# Patient Record
Sex: MALE | Race: WHITE
[De-identification: names, ages, dates, MRNs, and addresses within clinical notes are randomized per-mention and may not be internally consistent; named-entity substitution may affect disease eponyms.]

---
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
## 2022-11-21 ENCOUNTER — OFFICE (OUTPATIENT)
Dept: URBAN - METROPOLITAN AREA CLINIC 115 | Facility: CLINIC | Age: 22
Setting detail: OPHTHALMOLOGY
End: 2022-11-21
Payer: COMMERCIAL

## 2022-11-21 DIAGNOSIS — H10.45: ICD-10-CM

## 2022-11-21 DIAGNOSIS — D31.40: ICD-10-CM

## 2022-11-21 DIAGNOSIS — H43.391: ICD-10-CM

## 2022-11-21 DIAGNOSIS — H50.52: ICD-10-CM

## 2022-11-21 DIAGNOSIS — G43.109: ICD-10-CM

## 2022-11-21 DIAGNOSIS — H52.13: ICD-10-CM

## 2022-11-21 DIAGNOSIS — A69.20: ICD-10-CM

## 2022-11-21 PROCEDURE — 92014 COMPRE OPH EXAM EST PT 1/>: CPT | Performed by: OPHTHALMOLOGY

## 2022-11-21 ASSESSMENT — REFRACTION_MANIFEST
OS_CYLINDER: -1.25
OS_VA1: 20/20
OD_SPHERE: -8.50
OS_AXIS: 015
OD_AXIS: 150
OS_SPHERE: -8.75
OD_VA1: 20/20
OD_CYLINDER: -1.25

## 2022-11-21 ASSESSMENT — REFRACTION_CURRENTRX
OD_CYLINDER: -1.00
OS_SPHERE: -7.50
OD_OVR_VA: 20/
OD_VPRISM_DIRECTION: SV
OS_VPRISM_DIRECTION: SV
OS_VPRISM_DIRECTION: SV
OS_AXIS: 10
OD_AXIS: 146
OD_VPRISM_DIRECTION: SV
OS_CYLINDER: -1.00
OS_AXIS: 008
OS_OVR_VA: 20/
OD_AXIS: 158
OD_CYLINDER: -0.75
OS_CYLINDER: -0.50
OD_SPHERE: -8.25
OS_SPHERE: -8.25
OS_OVR_VA: 20/
OD_OVR_VA: 20/
OD_SPHERE: -7.00

## 2022-11-21 ASSESSMENT — REFRACTION_AUTOREFRACTION
OD_SPHERE: -9.75
OS_SPHERE: -10.25
OS_CYLINDER: -1.75
OS_AXIS: 179
OD_CYLINDER: -2.00
OD_AXIS: 161

## 2022-11-21 ASSESSMENT — SPHEQUIV_DERIVED
OD_SPHEQUIV: -9.125
OS_SPHEQUIV: -11.125
OS_SPHEQUIV: -9.375
OD_SPHEQUIV: -10.75

## 2022-11-21 ASSESSMENT — VISUAL ACUITY
OD_BCVA: 20/20-
OS_BCVA: 20/30-

## 2022-11-21 ASSESSMENT — CONFRONTATIONAL VISUAL FIELD TEST (CVF)
OD_FINDINGS: FULL
OS_FINDINGS: FULL

## 2022-11-21 ASSESSMENT — TONOMETRY: OD_IOP_MMHG: 19

## 2023-01-05 ENCOUNTER — OFFICE (OUTPATIENT)
Dept: URBAN - METROPOLITAN AREA CLINIC 115 | Facility: CLINIC | Age: 23
Setting detail: OPHTHALMOLOGY
End: 2023-01-05
Payer: COMMERCIAL

## 2023-01-05 DIAGNOSIS — A69.20: ICD-10-CM

## 2023-01-05 DIAGNOSIS — G43.109: ICD-10-CM

## 2023-01-05 DIAGNOSIS — H43.393: ICD-10-CM

## 2023-01-05 PROCEDURE — 92201 OPSCPY EXTND RTA DRAW UNI/BI: CPT | Performed by: OPHTHALMOLOGY

## 2023-01-05 PROCEDURE — 92014 COMPRE OPH EXAM EST PT 1/>: CPT | Performed by: OPHTHALMOLOGY

## 2023-01-05 PROCEDURE — 92134 CPTRZ OPH DX IMG PST SGM RTA: CPT | Performed by: OPHTHALMOLOGY

## 2023-01-05 ASSESSMENT — VISUAL ACUITY
OS_BCVA: 20/20-1
OD_BCVA: 20/20-1

## 2023-01-05 ASSESSMENT — REFRACTION_AUTOREFRACTION
OD_AXIS: 161
OS_AXIS: 179
OD_CYLINDER: -2.00
OS_SPHERE: -10.25
OS_CYLINDER: -1.75
OD_SPHERE: -9.75

## 2023-01-05 ASSESSMENT — TONOMETRY
OS_IOP_MMHG: 15
OD_IOP_MMHG: 16

## 2023-01-05 ASSESSMENT — SPHEQUIV_DERIVED
OS_SPHEQUIV: -11.125
OD_SPHEQUIV: -10.75

## 2023-02-08 ENCOUNTER — OFFICE (OUTPATIENT)
Dept: URBAN - METROPOLITAN AREA CLINIC 115 | Facility: CLINIC | Age: 23
Setting detail: OPHTHALMOLOGY
End: 2023-02-08
Payer: COMMERCIAL

## 2023-02-08 DIAGNOSIS — G43.109: ICD-10-CM

## 2023-02-08 DIAGNOSIS — H43.393: ICD-10-CM

## 2023-02-08 DIAGNOSIS — H10.45: ICD-10-CM

## 2023-02-08 DIAGNOSIS — A69.20: ICD-10-CM

## 2023-02-08 DIAGNOSIS — H53.433: ICD-10-CM

## 2023-02-08 PROCEDURE — 92083 EXTENDED VISUAL FIELD XM: CPT | Performed by: OPHTHALMOLOGY

## 2023-02-08 PROCEDURE — 92133 CPTRZD OPH DX IMG PST SGM ON: CPT | Performed by: OPHTHALMOLOGY

## 2023-02-08 PROCEDURE — 92014 COMPRE OPH EXAM EST PT 1/>: CPT | Performed by: OPHTHALMOLOGY

## 2023-02-08 ASSESSMENT — REFRACTION_CURRENTRX
OS_VPRISM_DIRECTION: SV
OD_AXIS: 150
OS_CYLINDER: -1.25
OS_OVR_VA: 20/
OS_AXIS: 008
OD_CYLINDER: -1.25
OS_SPHERE: -8.75
OD_OVR_VA: 20/
OD_SPHERE: -8.50
OD_VPRISM_DIRECTION: SV

## 2023-02-08 ASSESSMENT — REFRACTION_AUTOREFRACTION
OS_CYLINDER: -1.25
OD_AXIS: 159
OS_AXIS: 006
OS_SPHERE: -9.50
OD_SPHERE: -9.25
OD_CYLINDER: -1.50

## 2023-02-08 ASSESSMENT — VISUAL ACUITY
OD_BCVA: 20/25+3
OS_BCVA: 20/20-1

## 2023-02-08 ASSESSMENT — CONFRONTATIONAL VISUAL FIELD TEST (CVF)
OD_FINDINGS: FULL
OS_FINDINGS: FULL

## 2023-02-08 ASSESSMENT — TONOMETRY
OS_IOP_MMHG: 18
OD_IOP_MMHG: 18

## 2023-02-08 ASSESSMENT — SPHEQUIV_DERIVED
OS_SPHEQUIV: -10.125
OD_SPHEQUIV: -10

## 2023-03-14 ENCOUNTER — OFFICE (OUTPATIENT)
Dept: URBAN - METROPOLITAN AREA CLINIC 111 | Facility: CLINIC | Age: 23
Setting detail: OPHTHALMOLOGY
End: 2023-03-14
Payer: COMMERCIAL

## 2023-03-14 VITALS — HEIGHT: 73 IN | WEIGHT: 285.6 LBS | BODY MASS INDEX: 37.85 KG/M2

## 2023-03-14 DIAGNOSIS — H10.45: ICD-10-CM

## 2023-03-14 DIAGNOSIS — H50.52: ICD-10-CM

## 2023-03-14 DIAGNOSIS — A69.20: ICD-10-CM

## 2023-03-14 DIAGNOSIS — D31.40: ICD-10-CM

## 2023-03-14 DIAGNOSIS — G43.109: ICD-10-CM

## 2023-03-14 DIAGNOSIS — H52.13: ICD-10-CM

## 2023-03-14 DIAGNOSIS — H43.393: ICD-10-CM

## 2023-03-14 PROCEDURE — 92015 DETERMINE REFRACTIVE STATE: CPT | Performed by: OPHTHALMOLOGY

## 2023-03-14 PROCEDURE — 92014 COMPRE OPH EXAM EST PT 1/>: CPT | Performed by: OPHTHALMOLOGY

## 2023-03-14 ASSESSMENT — VISUAL ACUITY
OD_BCVA: 20/20
OS_BCVA: 20/25-1,20-1

## 2023-03-14 ASSESSMENT — REFRACTION_MANIFEST
OS_SPHERE: -9.00
OD_AXIS: 150
OD_VA1: 20/20
OS_CYLINDER: -1.25
OS_VA1: 20/20
OD_CYLINDER: -1.50
OS_AXIS: 010
OD_SPHERE: -8.50

## 2023-03-14 ASSESSMENT — REFRACTION_CURRENTRX
OS_CYLINDER: -1.25
OS_VPRISM_DIRECTION: SV
OS_SPHERE: -8.75
OD_VPRISM_DIRECTION: SV
OD_CYLINDER: -1.25
OD_OVR_VA: 20/
OD_SPHERE: -8.50
OD_AXIS: 150
OS_AXIS: 008
OS_OVR_VA: 20/

## 2023-03-14 ASSESSMENT — REFRACTION_AUTOREFRACTION
OD_AXIS: 159
OD_CYLINDER: -1.50
OS_AXIS: 010
OS_CYLINDER: -1.25
OS_SPHERE: -9.75
OD_SPHERE: -9.25

## 2023-03-14 ASSESSMENT — TONOMETRY
OD_IOP_MMHG: 17
OS_IOP_MMHG: 17

## 2023-03-14 ASSESSMENT — SPHEQUIV_DERIVED
OS_SPHEQUIV: -10.375
OD_SPHEQUIV: -9.25
OS_SPHEQUIV: -9.625
OD_SPHEQUIV: -10

## 2023-03-14 ASSESSMENT — CONFRONTATIONAL VISUAL FIELD TEST (CVF)
OD_FINDINGS: FULL
OS_FINDINGS: FULL

## 2023-03-18 ENCOUNTER — NON-APPOINTMENT (OUTPATIENT)
Age: 23
End: 2023-03-18

## 2023-03-29 ENCOUNTER — OFFICE (OUTPATIENT)
Dept: URBAN - METROPOLITAN AREA CLINIC 115 | Facility: CLINIC | Age: 23
Setting detail: OPHTHALMOLOGY
End: 2023-03-29
Payer: COMMERCIAL

## 2023-03-29 DIAGNOSIS — A69.20: ICD-10-CM

## 2023-03-29 DIAGNOSIS — D31.40: ICD-10-CM

## 2023-03-29 DIAGNOSIS — H50.52: ICD-10-CM

## 2023-03-29 DIAGNOSIS — G43.109: ICD-10-CM

## 2023-03-29 DIAGNOSIS — H43.393: ICD-10-CM

## 2023-03-29 DIAGNOSIS — H52.13: ICD-10-CM

## 2023-03-29 DIAGNOSIS — H10.45: ICD-10-CM

## 2023-03-29 PROCEDURE — 99213 OFFICE O/P EST LOW 20 MIN: CPT | Performed by: OPHTHALMOLOGY

## 2023-03-29 PROCEDURE — 92250 FUNDUS PHOTOGRAPHY W/I&R: CPT | Performed by: OPHTHALMOLOGY

## 2023-03-29 PROCEDURE — 92083 EXTENDED VISUAL FIELD XM: CPT | Performed by: OPHTHALMOLOGY

## 2023-03-29 ASSESSMENT — REFRACTION_CURRENTRX
OD_SPHERE: -8.50
OS_SPHERE: -8.75
OS_VPRISM_DIRECTION: SV
OS_AXIS: 008
OS_OVR_VA: 20/
OS_CYLINDER: -1.25
OD_CYLINDER: -1.25
OD_VPRISM_DIRECTION: SV
OD_AXIS: 150
OD_OVR_VA: 20/

## 2023-03-29 ASSESSMENT — REFRACTION_MANIFEST
OD_CYLINDER: -1.50
OS_CYLINDER: -1.25
OD_AXIS: 150
OD_VA1: 20/20
OS_VA1: 20/20
OS_AXIS: 010
OD_SPHERE: -8.50
OS_SPHERE: -9.00

## 2023-03-29 ASSESSMENT — REFRACTION_AUTOREFRACTION
OS_CYLINDER: -0.75
OS_SPHERE: -9.25
OS_AXIS: 018
OD_CYLINDER: -1.00
OD_SPHERE: -10.00
OD_AXIS: 160

## 2023-03-29 ASSESSMENT — VISUAL ACUITY
OD_BCVA: 20/20
OS_BCVA: 20/20

## 2023-03-29 ASSESSMENT — CONFRONTATIONAL VISUAL FIELD TEST (CVF)
OS_FINDINGS: FULL
OD_FINDINGS: FULL

## 2023-03-29 ASSESSMENT — SPHEQUIV_DERIVED
OS_SPHEQUIV: -9.625
OD_SPHEQUIV: -9.25
OD_SPHEQUIV: -10.5
OS_SPHEQUIV: -9.625

## 2023-03-29 ASSESSMENT — TONOMETRY
OD_IOP_MMHG: 16
OS_IOP_MMHG: 14

## 2024-04-15 ENCOUNTER — OFFICE (OUTPATIENT)
Dept: URBAN - METROPOLITAN AREA CLINIC 115 | Facility: CLINIC | Age: 24
Setting detail: OPHTHALMOLOGY
End: 2024-04-15
Payer: COMMERCIAL

## 2024-04-15 VITALS — HEIGHT: 73 IN | WEIGHT: 285.6 LBS | BODY MASS INDEX: 37.85 KG/M2

## 2024-04-15 DIAGNOSIS — D31.40: ICD-10-CM

## 2024-04-15 DIAGNOSIS — G43.109: ICD-10-CM

## 2024-04-15 DIAGNOSIS — H52.13: ICD-10-CM

## 2024-04-15 DIAGNOSIS — H10.45: ICD-10-CM

## 2024-04-15 DIAGNOSIS — H50.52: ICD-10-CM

## 2024-04-15 DIAGNOSIS — A69.20: ICD-10-CM

## 2024-04-15 DIAGNOSIS — H43.393: ICD-10-CM

## 2024-04-15 PROCEDURE — 92015 DETERMINE REFRACTIVE STATE: CPT | Performed by: OPHTHALMOLOGY

## 2024-04-15 PROCEDURE — 92014 COMPRE OPH EXAM EST PT 1/>: CPT | Performed by: OPHTHALMOLOGY

## 2025-04-10 ENCOUNTER — OFFICE (OUTPATIENT)
Dept: URBAN - METROPOLITAN AREA CLINIC 115 | Facility: CLINIC | Age: 25
Setting detail: OPHTHALMOLOGY
End: 2025-04-10
Payer: COMMERCIAL

## 2025-04-10 VITALS — BODY MASS INDEX: 37.85 KG/M2 | WEIGHT: 285.6 LBS | HEIGHT: 73 IN

## 2025-04-10 DIAGNOSIS — H52.13: ICD-10-CM

## 2025-04-10 DIAGNOSIS — H43.393: ICD-10-CM

## 2025-04-10 PROCEDURE — 92012 INTRM OPH EXAM EST PATIENT: CPT | Performed by: OPTOMETRIST

## 2025-04-10 PROCEDURE — 92015 DETERMINE REFRACTIVE STATE: CPT | Performed by: OPTOMETRIST

## 2025-04-10 ASSESSMENT — REFRACTION_AUTOREFRACTION
OS_CYLINDER: -1.00
OD_CYLINDER: -1.50
OD_SPHERE: -9.50
OD_AXIS: 160
OS_SPHERE: -10.00
OS_AXIS: 011

## 2025-04-10 ASSESSMENT — REFRACTION_MANIFEST
OD_AXIS: 155
OD_VA1: 20/20
OS_AXIS: 011
OS_VA1: 20/20
OD_CYLINDER: -1.25
OS_SPHERE: -9.50
OD_VA1: 20/20
OS_VA1: 20/20
OS_CYLINDER: -0.50
OD_SPHERE: -8.75
OU_VA: 20/20
OS_SPHERE: -9.50
OD_CYLINDER: -1.50
OS_CYLINDER: -1.00
OS_AXIS: 005
OD_SPHERE: -9.00
OD_AXIS: 160

## 2025-04-10 ASSESSMENT — REFRACTION_CURRENTRX
OS_OVR_VA: 20/
OD_AXIS: 150
OS_SPHERE: -8.75
OD_CYLINDER: -1.25
OS_CYLINDER: -0.75
OD_VPRISM_DIRECTION: SV
OS_AXIS: 177
OS_VPRISM_DIRECTION: SV
OS_OVR_VA: 20/
OS_SPHERE: -9.25
OD_SPHERE: -8.50
OD_OVR_VA: 20/
OD_VPRISM_DIRECTION: SV
OS_AXIS: 011
OD_AXIS: 154
OS_CYLINDER: -1.25
OS_VPRISM_DIRECTION: SV
OD_CYLINDER: -1.25
OD_SPHERE: -8.75
OD_OVR_VA: 20/

## 2025-04-10 ASSESSMENT — VISUAL ACUITY
OD_BCVA: 20/25-1
OS_BCVA: 20/30

## 2025-04-10 ASSESSMENT — TONOMETRY
OD_IOP_MMHG: 18
OS_IOP_MMHG: 19

## 2025-04-10 ASSESSMENT — CONFRONTATIONAL VISUAL FIELD TEST (CVF)
OS_FINDINGS: FULL
OD_FINDINGS: FULL